# Patient Record
Sex: MALE | Race: WHITE | NOT HISPANIC OR LATINO | Employment: FULL TIME | URBAN - METROPOLITAN AREA
[De-identification: names, ages, dates, MRNs, and addresses within clinical notes are randomized per-mention and may not be internally consistent; named-entity substitution may affect disease eponyms.]

---

## 2023-03-04 PROBLEM — F41.9 ANXIETY: Status: ACTIVE | Noted: 2023-03-04

## 2023-03-04 PROBLEM — G47.00 INSOMNIA: Status: ACTIVE | Noted: 2023-03-04

## 2023-03-04 PROBLEM — K21.9 GERD (GASTROESOPHAGEAL REFLUX DISEASE): Status: ACTIVE | Noted: 2023-03-04

## 2023-03-04 PROBLEM — R74.8 ELEVATED LIVER ENZYMES: Status: ACTIVE | Noted: 2023-03-04

## 2023-03-04 PROBLEM — M79.673 FOOT PAIN: Status: ACTIVE | Noted: 2023-03-04

## 2023-03-04 PROBLEM — T63.441A STING, BEE: Status: ACTIVE | Noted: 2023-03-04

## 2023-03-04 PROBLEM — R79.89 LOW VITAMIN D LEVEL: Status: ACTIVE | Noted: 2023-03-04

## 2023-03-04 PROBLEM — R19.7 DIARRHEA: Status: ACTIVE | Noted: 2023-03-04

## 2023-03-04 PROBLEM — R60.9 EDEMA: Status: ACTIVE | Noted: 2023-03-04

## 2023-03-04 PROBLEM — K58.9 IRRITABLE BOWEL SYNDROME: Status: ACTIVE | Noted: 2023-03-04

## 2023-03-04 PROBLEM — R03.0 ELEVATED BLOOD PRESSURE READING: Status: ACTIVE | Noted: 2023-03-04

## 2023-03-04 PROBLEM — R53.83 FATIGUE: Status: ACTIVE | Noted: 2023-03-04

## 2023-03-04 RX ORDER — OMEPRAZOLE 40 MG/1
1 CAPSULE, DELAYED RELEASE ORAL DAILY
COMMUNITY
Start: 2019-07-16 | End: 2023-03-13

## 2023-03-04 RX ORDER — NORTRIPTYLINE HYDROCHLORIDE 25 MG/1
1 CAPSULE ORAL NIGHTLY
COMMUNITY
Start: 2019-07-16 | End: 2023-03-13

## 2023-03-04 RX ORDER — ASPIRIN 325 MG
TABLET, DELAYED RELEASE (ENTERIC COATED) ORAL
COMMUNITY
Start: 2019-07-19

## 2023-03-06 ENCOUNTER — APPOINTMENT (OUTPATIENT)
Dept: PRIMARY CARE | Facility: CLINIC | Age: 33
End: 2023-03-06
Payer: COMMERCIAL

## 2023-03-13 ENCOUNTER — OFFICE VISIT (OUTPATIENT)
Dept: PRIMARY CARE | Facility: CLINIC | Age: 33
End: 2023-03-13
Payer: COMMERCIAL

## 2023-03-13 VITALS — DIASTOLIC BLOOD PRESSURE: 85 MMHG | SYSTOLIC BLOOD PRESSURE: 145 MMHG | OXYGEN SATURATION: 99 % | HEART RATE: 103 BPM

## 2023-03-13 DIAGNOSIS — B37.0 THRUSH, ORAL: Primary | ICD-10-CM

## 2023-03-13 PROBLEM — L40.9 PSORIASIS: Status: ACTIVE | Noted: 2023-03-13

## 2023-03-13 LAB — POC HEMOGLOBIN A1C: 4.7 % (ref 4.2–6.5)

## 2023-03-13 PROCEDURE — 1036F TOBACCO NON-USER: CPT | Performed by: FAMILY MEDICINE

## 2023-03-13 PROCEDURE — 83036 HEMOGLOBIN GLYCOSYLATED A1C: CPT | Performed by: FAMILY MEDICINE

## 2023-03-13 PROCEDURE — 99203 OFFICE O/P NEW LOW 30 MIN: CPT | Performed by: FAMILY MEDICINE

## 2023-03-13 RX ORDER — NYSTATIN 100000 [USP'U]/ML
500000 SUSPENSION ORAL 4 TIMES DAILY
Qty: 280 ML | Refills: 0 | Status: SHIPPED | OUTPATIENT
Start: 2023-03-13 | End: 2023-03-27

## 2023-03-13 ASSESSMENT — ENCOUNTER SYMPTOMS
SHORTNESS OF BREATH: 0
COUGH: 0
NAUSEA: 1
SORE THROAT: 0
ABDOMINAL PAIN: 1
FEVER: 0
POLYPHAGIA: 0
VOMITING: 0
ADENOPATHY: 0
DIARRHEA: 1
BLOOD IN STOOL: 0
CHILLS: 0
CONFUSION: 0
POLYDIPSIA: 0
TROUBLE SWALLOWING: 0
UNEXPECTED WEIGHT CHANGE: 0
VOICE CHANGE: 0
WHEEZING: 0
RHINORRHEA: 0

## 2023-03-13 ASSESSMENT — PATIENT HEALTH QUESTIONNAIRE - PHQ9
SUM OF ALL RESPONSES TO PHQ9 QUESTIONS 1 & 2: 0
2. FEELING DOWN, DEPRESSED OR HOPELESS: NOT AT ALL
1. LITTLE INTEREST OR PLEASURE IN DOING THINGS: NOT AT ALL

## 2023-03-13 NOTE — PATIENT INSTRUCTIONS
Please return for a wellness visit after labs.    Please return or seek medical attention earlier if symptoms persist, change, worsen, or return. For emergencies, call [phone] or go to the nearest Emergency Room.

## 2023-03-13 NOTE — PROGRESS NOTES
Subjective   Patient ID: Armando Olivera is a 32 y.o. male who presents for Thrush (Pt presents complaining of a white coating on tongue and mouth, was given methylprednisolone in Dec thinks it could be from that).  C/o white film in mouth. Started in December after a course of Medrol (for psoriasis). Denies any other symptoms; denies burning, tingling, change in taste, fever, etc. Otherwise feeling well.     Saw dermatologist once in Dec 2022.        Review of Systems   Constitutional:  Negative for chills, fever and unexpected weight change.   HENT:  Positive for mouth sores. Negative for congestion, postnasal drip, rhinorrhea, sore throat, trouble swallowing and voice change.    Respiratory:  Negative for cough, shortness of breath and wheezing.    Gastrointestinal:  Positive for abdominal pain (stable with IBS), diarrhea (stable with IBS) and nausea (stable with IBS). Negative for blood in stool and vomiting.   Endocrine: Negative for cold intolerance, heat intolerance, polydipsia, polyphagia and polyuria.   Genitourinary: Negative.    Skin:  Negative for rash.   Hematological:  Negative for adenopathy.   Psychiatric/Behavioral:  Negative for behavioral problems and confusion.        Objective   Physical Exam  Constitutional:       Appearance: Normal appearance.   HENT:      Mouth/Throat:      Mouth: Mucous membranes are moist.      Comments: White plaques on the tongue and buccal mucosa.  Eyes:      General: No scleral icterus.     Extraocular Movements: Extraocular movements intact.      Conjunctiva/sclera: Conjunctivae normal.   Cardiovascular:      Rate and Rhythm: Normal rate and regular rhythm.      Heart sounds: Normal heart sounds.   Pulmonary:      Effort: Pulmonary effort is normal.      Breath sounds: Normal breath sounds.   Abdominal:      General: Abdomen is flat. There is no distension.      Palpations: Abdomen is soft. There is no mass.      Tenderness: There is no abdominal tenderness. There is  no guarding or rebound.   Musculoskeletal:      Cervical back: Normal range of motion and neck supple. No tenderness.   Lymphadenopathy:      Cervical: No cervical adenopathy.   Skin:     General: Skin is warm and dry.   Neurological:      Mental Status: He is alert and oriented to person, place, and time. Mental status is at baseline.   Psychiatric:         Mood and Affect: Mood normal.         Behavior: Behavior normal.         Thought Content: Thought content normal.         Judgment: Judgment normal.         Assessment/Plan   Diagnoses and all orders for this visit:  Thrush, oral  Comments:  Nystatin. Check basic/wellness labs.

## 2024-05-13 ENCOUNTER — OFFICE VISIT (OUTPATIENT)
Dept: PRIMARY CARE | Facility: CLINIC | Age: 34
End: 2024-05-13
Payer: COMMERCIAL

## 2024-05-13 VITALS
HEART RATE: 104 BPM | DIASTOLIC BLOOD PRESSURE: 76 MMHG | WEIGHT: 183.8 LBS | BODY MASS INDEX: 27.22 KG/M2 | SYSTOLIC BLOOD PRESSURE: 130 MMHG | OXYGEN SATURATION: 98 % | HEIGHT: 69 IN

## 2024-05-13 DIAGNOSIS — Z76.89 ESTABLISHING CARE WITH NEW DOCTOR, ENCOUNTER FOR: ICD-10-CM

## 2024-05-13 DIAGNOSIS — B37.0 THRUSH: ICD-10-CM

## 2024-05-13 DIAGNOSIS — K58.0 IRRITABLE BOWEL SYNDROME WITH DIARRHEA: ICD-10-CM

## 2024-05-13 DIAGNOSIS — Z00.00 HEALTHCARE MAINTENANCE: ICD-10-CM

## 2024-05-13 DIAGNOSIS — Z23 NEED FOR TDAP VACCINATION: Primary | ICD-10-CM

## 2024-05-13 DIAGNOSIS — F41.9 ANXIETY: ICD-10-CM

## 2024-05-13 PROCEDURE — 99204 OFFICE O/P NEW MOD 45 MIN: CPT | Performed by: STUDENT IN AN ORGANIZED HEALTH CARE EDUCATION/TRAINING PROGRAM

## 2024-05-13 PROCEDURE — 90715 TDAP VACCINE 7 YRS/> IM: CPT | Performed by: STUDENT IN AN ORGANIZED HEALTH CARE EDUCATION/TRAINING PROGRAM

## 2024-05-13 PROCEDURE — 1036F TOBACCO NON-USER: CPT | Performed by: STUDENT IN AN ORGANIZED HEALTH CARE EDUCATION/TRAINING PROGRAM

## 2024-05-13 PROCEDURE — 90471 IMMUNIZATION ADMIN: CPT | Performed by: STUDENT IN AN ORGANIZED HEALTH CARE EDUCATION/TRAINING PROGRAM

## 2024-05-13 RX ORDER — ESCITALOPRAM OXALATE 10 MG/1
10 TABLET ORAL DAILY
Qty: 30 TABLET | Refills: 1 | Status: SHIPPED | OUTPATIENT
Start: 2024-05-13 | End: 2024-11-09

## 2024-05-13 RX ORDER — NYSTATIN 100000 [USP'U]/ML
500000 SUSPENSION ORAL 4 TIMES DAILY
Qty: 280 ML | Refills: 0 | Status: SHIPPED | OUTPATIENT
Start: 2024-05-13 | End: 2024-05-27

## 2024-05-13 ASSESSMENT — PATIENT HEALTH QUESTIONNAIRE - PHQ9
2. FEELING DOWN, DEPRESSED OR HOPELESS: NOT AT ALL
1. LITTLE INTEREST OR PLEASURE IN DOING THINGS: NOT AT ALL
SUM OF ALL RESPONSES TO PHQ9 QUESTIONS 1 AND 2: 0

## 2024-05-13 ASSESSMENT — PAIN SCALES - GENERAL: PAINLEVEL: 0-NO PAIN

## 2024-05-13 NOTE — PROGRESS NOTES
"Subjective   Patient ID: Armando Olivera is a 33 y.o. male who presents for Establish Care (Anxiety, thrush).    HPI  34 yo here to establish care  Est care  Anxiety  Started getting worse during covid  Would like to start medication  Has done therapy in past  3. Thrush  Has had thrush in past  Has been back for 6 months  4. IBS  Takes CBD  Dietary modifications  5. HM  Would like tdap  Has improved diet after realizing that his cholesterol was elevated    Review of Systems  All pertinent positive symptoms are included in the history of present illness.    All other systems have been reviewed and are negative and noncontributory to this patient's current ailments.     Allergies   Allergen Reactions    Augmentin [Amoxicillin-Pot Clavulanate] Other        Immunization History   Administered Date(s) Administered    DT (pediatric) 09/11/2007    DTP 1989, 05/13/1991, 09/01/1994, 01/02/1995, 09/27/1998    Hepatitis B vaccine, adult (RECOMBIVAX, ENGERIX) 08/12/1998, 09/16/1998, 03/17/1999    Hib (HbOC) 09/01/1997    MMR vaccine, subcutaneous (MMR II) 01/26/1990, 08/02/2001    PPD Test 08/21/2003    Poliovirus vaccine, subcutaneous (IPOL) 1989, 1989, 05/13/1991, 01/02/1995    Varicella vaccine, subcutaneous (VARIVAX) 08/19/1997, 08/02/2011       Objective   Vitals:    05/13/24 1406   BP: 130/76   Pulse: 104   SpO2: 98%   Weight: 83.4 kg (183 lb 12.8 oz)   Height: 1.753 m (5' 9\")       Physical Exam  CONSTITUTIONAL - well nourished, well developed, looks like stated age, in no acute distress  SKIN - normal skin color and pigmentation. No rash, lesions, or nodules visualized  HEAD - no trauma, normocephalic  EYES - extraocular muscles are intact  ENT - atraumatic  NECK - no neck mass was observed  LUNGS - CTA B/L  CARDIAC - regular rate and regular rhythm  ABDOMEN - no organomegaly, soft, nontender, nondistended  EXTREMITIES - no edema, no deformities  MSK - moves limbs equally  NEUROLOGICAL - alert, " oriented and no focal signs  PSYCHIATRIC - alert, pleasant and cordial, age-appropriate  IMMUNOLOGIC - no cervical lymphadenopathy     Assessment/Plan   32 yo here to establish care  1. Est care  PMHx reviewed  2. Anxiety  Start lexapro  3. Thrush  Start nystatin swish and swallow  4. IBS  Continue CBD, Dietary modifications  5. HM  Tdap given  Blood work ordered    RTC in 6 weeks for anxiety follow up

## 2024-05-21 ENCOUNTER — LAB (OUTPATIENT)
Dept: LAB | Facility: LAB | Age: 34
End: 2024-05-21
Payer: COMMERCIAL

## 2024-05-21 DIAGNOSIS — Z00.00 HEALTHCARE MAINTENANCE: ICD-10-CM

## 2024-05-21 LAB
BASOPHILS # BLD AUTO: 0.04 X10*3/UL (ref 0–0.1)
BASOPHILS NFR BLD AUTO: 0.7 %
EOSINOPHIL # BLD AUTO: 0.06 X10*3/UL (ref 0–0.7)
EOSINOPHIL NFR BLD AUTO: 1.1 %
ERYTHROCYTE [DISTWIDTH] IN BLOOD BY AUTOMATED COUNT: 11.9 % (ref 11.5–14.5)
HCT VFR BLD AUTO: 45 % (ref 41–52)
HGB BLD-MCNC: 15.9 G/DL (ref 13.5–17.5)
IMM GRANULOCYTES # BLD AUTO: 0.01 X10*3/UL (ref 0–0.7)
IMM GRANULOCYTES NFR BLD AUTO: 0.2 % (ref 0–0.9)
LYMPHOCYTES # BLD AUTO: 0.98 X10*3/UL (ref 1.2–4.8)
LYMPHOCYTES NFR BLD AUTO: 17.8 %
MCH RBC QN AUTO: 30.2 PG (ref 26–34)
MCHC RBC AUTO-ENTMCNC: 35.3 G/DL (ref 32–36)
MCV RBC AUTO: 86 FL (ref 80–100)
MONOCYTES # BLD AUTO: 0.28 X10*3/UL (ref 0.1–1)
MONOCYTES NFR BLD AUTO: 5.1 %
NEUTROPHILS # BLD AUTO: 4.13 X10*3/UL (ref 1.2–7.7)
NEUTROPHILS NFR BLD AUTO: 75.1 %
NRBC BLD-RTO: 0 /100 WBCS (ref 0–0)
PLATELET # BLD AUTO: 279 X10*3/UL (ref 150–450)
RBC # BLD AUTO: 5.26 X10*6/UL (ref 4.5–5.9)
TSH SERPL-ACNC: 1.04 MIU/L (ref 0.44–3.98)
WBC # BLD AUTO: 5.5 X10*3/UL (ref 4.4–11.3)

## 2024-05-21 PROCEDURE — 36415 COLL VENOUS BLD VENIPUNCTURE: CPT

## 2024-05-21 PROCEDURE — 85025 COMPLETE CBC W/AUTO DIFF WBC: CPT

## 2024-05-21 PROCEDURE — 80053 COMPREHEN METABOLIC PANEL: CPT

## 2024-05-21 PROCEDURE — 80061 LIPID PANEL: CPT

## 2024-05-21 PROCEDURE — 84443 ASSAY THYROID STIM HORMONE: CPT

## 2024-05-22 ENCOUNTER — E-VISIT (OUTPATIENT)
Dept: PRIMARY CARE | Facility: CLINIC | Age: 34
End: 2024-05-22
Payer: COMMERCIAL

## 2024-05-22 LAB
ALBUMIN SERPL BCP-MCNC: 4.9 G/DL (ref 3.4–5)
ALP SERPL-CCNC: 55 U/L (ref 33–120)
ALT SERPL W P-5'-P-CCNC: 61 U/L (ref 10–52)
ANION GAP SERPL CALC-SCNC: 14 MMOL/L (ref 10–20)
AST SERPL W P-5'-P-CCNC: 27 U/L (ref 9–39)
BILIRUB SERPL-MCNC: 0.6 MG/DL (ref 0–1.2)
BUN SERPL-MCNC: 12 MG/DL (ref 6–23)
CALCIUM SERPL-MCNC: 9.6 MG/DL (ref 8.6–10.6)
CHLORIDE SERPL-SCNC: 101 MMOL/L (ref 98–107)
CHOLEST SERPL-MCNC: 203 MG/DL (ref 0–199)
CHOLESTEROL/HDL RATIO: 2.9
CO2 SERPL-SCNC: 29 MMOL/L (ref 21–32)
CREAT SERPL-MCNC: 0.88 MG/DL (ref 0.5–1.3)
EGFRCR SERPLBLD CKD-EPI 2021: >90 ML/MIN/1.73M*2
GLUCOSE SERPL-MCNC: 94 MG/DL (ref 74–99)
HDLC SERPL-MCNC: 70.2 MG/DL
LDLC SERPL CALC-MCNC: 111 MG/DL
NON HDL CHOLESTEROL: 133 MG/DL (ref 0–149)
POTASSIUM SERPL-SCNC: 4.2 MMOL/L (ref 3.5–5.3)
PROT SERPL-MCNC: 7.5 G/DL (ref 6.4–8.2)
SODIUM SERPL-SCNC: 140 MMOL/L (ref 136–145)
TRIGL SERPL-MCNC: 107 MG/DL (ref 0–149)
VLDL: 21 MG/DL (ref 0–40)

## 2024-05-24 ENCOUNTER — TELEPHONE (OUTPATIENT)
Dept: PRIMARY CARE | Facility: CLINIC | Age: 34
End: 2024-05-24
Payer: COMMERCIAL

## 2024-05-29 ENCOUNTER — OFFICE VISIT (OUTPATIENT)
Dept: PRIMARY CARE | Facility: CLINIC | Age: 34
End: 2024-05-29
Payer: COMMERCIAL

## 2024-05-29 VITALS
HEART RATE: 100 BPM | BODY MASS INDEX: 26.96 KG/M2 | DIASTOLIC BLOOD PRESSURE: 85 MMHG | WEIGHT: 182 LBS | HEIGHT: 69 IN | SYSTOLIC BLOOD PRESSURE: 130 MMHG | OXYGEN SATURATION: 97 %

## 2024-05-29 DIAGNOSIS — R03.0 ELEVATED BP WITHOUT DIAGNOSIS OF HYPERTENSION: ICD-10-CM

## 2024-05-29 DIAGNOSIS — G47.30 SLEEP APNEA, UNSPECIFIED TYPE: Primary | ICD-10-CM

## 2024-05-29 PROCEDURE — 99213 OFFICE O/P EST LOW 20 MIN: CPT | Performed by: STUDENT IN AN ORGANIZED HEALTH CARE EDUCATION/TRAINING PROGRAM

## 2024-05-29 PROCEDURE — 1036F TOBACCO NON-USER: CPT | Performed by: STUDENT IN AN ORGANIZED HEALTH CARE EDUCATION/TRAINING PROGRAM

## 2024-05-29 RX ORDER — DEXAMETHASONE 0.75 MG/1
TABLET ORAL
COMMUNITY
Start: 2024-05-28

## 2024-05-29 RX ORDER — IBUPROFEN 800 MG/1
TABLET ORAL
COMMUNITY
Start: 2024-05-28

## 2024-05-29 ASSESSMENT — PATIENT HEALTH QUESTIONNAIRE - PHQ9
2. FEELING DOWN, DEPRESSED OR HOPELESS: NOT AT ALL
SUM OF ALL RESPONSES TO PHQ9 QUESTIONS 1 AND 2: 0
1. LITTLE INTEREST OR PLEASURE IN DOING THINGS: NOT AT ALL

## 2024-05-29 ASSESSMENT — PAIN SCALES - GENERAL: PAINLEVEL: 0-NO PAIN

## 2024-05-29 NOTE — PROGRESS NOTES
"Subjective   Patient ID: Armando Olivera is a 33 y.o. male who presents for Follow-up (BP follow up).    HPI  32 yo male here for BP follow up  Concern for sleep apnea  Has woken up gasping for air  Snores occasionally  Cousin has sleep apnea  Doesn't feel well rested in the morning  Wakes up with headaches half the time  Sleeps with mouth open  Can take him hours to fall asleep  2. Elevated BP at dentist  Has been checking Bps at home  Bps here WNL    Review of Systems  All pertinent positive symptoms are included in the history of present illness.    All other systems have been reviewed and are negative and noncontributory to this patient's current ailments.     Allergies   Allergen Reactions    Augmentin [Amoxicillin-Pot Clavulanate] Other        Immunization History   Administered Date(s) Administered    DT (pediatric) 09/11/2007    DTP 1989, 05/13/1991, 09/01/1994, 01/02/1995, 09/27/1998    Hepatitis B vaccine, adult (RECOMBIVAX, ENGERIX) 08/12/1998, 09/16/1998, 03/17/1999    Hib (HbOC) 09/01/1997    MMR vaccine, subcutaneous (MMR II) 01/26/1990, 08/02/2001    PPD Test 08/21/2003    Poliovirus vaccine, subcutaneous (IPOL) 1989, 1989, 05/13/1991, 01/02/1995    Tdap vaccine, age 7 year and older (BOOSTRIX, ADACEL) 05/13/2024    Varicella vaccine, subcutaneous (VARIVAX) 08/19/1997, 08/02/2011       Objective   Vitals:    05/29/24 1034   BP: 126/82   Pulse: 100   SpO2: 97%   Weight: 82.6 kg (182 lb)   Height: 1.753 m (5' 9\")       Physical Exam  CONSTITUTIONAL - well nourished, well developed, looks like stated age, in no acute distress  SKIN - normal skin color and pigmentation. No rash, lesions, or nodules visualized  HEAD - no trauma, normocephalic  EYES - extraocular muscles are intact  ENT - atraumatic  NECK - no neck mass was observed  LUNGS - CTA B/L  CARDIAC - regular rate and regular rhythm  ABDOMEN - no organomegaly, soft, nontender, nondistended  EXTREMITIES - no edema, no " deformities  MSK - moves limbs equally  NEUROLOGICAL - alert, oriented and no focal signs  PSYCHIATRIC - alert, pleasant and cordial, age-appropriate  IMMUNOLOGIC - no cervical lymphadenopathy     Assessment/Plan   32 yo male here for BP follow up  Concern for sleep apnea  Sleep study ordered  2. Elevated BP at dentist  Continue anxiety medication, will continue to monitor Bps    RTC as needed

## 2024-06-25 ENCOUNTER — OFFICE VISIT (OUTPATIENT)
Dept: PRIMARY CARE | Facility: CLINIC | Age: 34
End: 2024-06-25
Payer: COMMERCIAL

## 2024-06-25 VITALS
OXYGEN SATURATION: 99 % | HEART RATE: 85 BPM | DIASTOLIC BLOOD PRESSURE: 82 MMHG | BODY MASS INDEX: 27.22 KG/M2 | WEIGHT: 183.8 LBS | SYSTOLIC BLOOD PRESSURE: 130 MMHG | HEIGHT: 69 IN

## 2024-06-25 DIAGNOSIS — F41.9 ANXIETY: ICD-10-CM

## 2024-06-25 PROCEDURE — 99213 OFFICE O/P EST LOW 20 MIN: CPT | Performed by: STUDENT IN AN ORGANIZED HEALTH CARE EDUCATION/TRAINING PROGRAM

## 2024-06-25 PROCEDURE — 1036F TOBACCO NON-USER: CPT | Performed by: STUDENT IN AN ORGANIZED HEALTH CARE EDUCATION/TRAINING PROGRAM

## 2024-06-25 RX ORDER — ESCITALOPRAM OXALATE 10 MG/1
10 TABLET ORAL DAILY
Qty: 90 TABLET | Refills: 1 | Status: SHIPPED | OUTPATIENT
Start: 2024-06-25 | End: 2024-12-22

## 2024-06-25 ASSESSMENT — PAIN SCALES - GENERAL: PAINLEVEL: 0-NO PAIN

## 2024-06-25 ASSESSMENT — PATIENT HEALTH QUESTIONNAIRE - PHQ9
1. LITTLE INTEREST OR PLEASURE IN DOING THINGS: NOT AT ALL
SUM OF ALL RESPONSES TO PHQ9 QUESTIONS 1 AND 2: 0
2. FEELING DOWN, DEPRESSED OR HOPELESS: NOT AT ALL

## 2024-06-25 NOTE — PROGRESS NOTES
"Subjective   Patient ID: Armando Olivera is a 33 y.o. male who presents for Follow-up (anxiety).    HPI  34 yo male here for anxiety follow up  Anxiety  Feels like he can think more clearly  Denies side effects currently  Feels good on current dose  Did forget dose one day and felt a difference    Review of Systems  All pertinent positive symptoms are included in the history of present illness.    All other systems have been reviewed and are negative and noncontributory to this patient's current ailments.     Allergies   Allergen Reactions    Augmentin [Amoxicillin-Pot Clavulanate] Other        Immunization History   Administered Date(s) Administered    DT (pediatric) 09/11/2007    DTP 1989, 05/13/1991, 09/01/1994, 01/02/1995, 09/27/1998    Hepatitis B vaccine, adult *Check Product/Dose* 08/12/1998, 09/16/1998, 03/17/1999    Hib (HbOC) 09/01/1997    MMR vaccine, subcutaneous (MMR II) 01/26/1990, 08/02/2001    PPD Test 08/21/2003    Poliovirus vaccine, subcutaneous (IPOL) 1989, 1989, 05/13/1991, 01/02/1995    Tdap vaccine, age 7 year and older (BOOSTRIX, ADACEL) 05/13/2024    Varicella vaccine, subcutaneous (VARIVAX) 08/19/1997, 08/02/2011       Objective   Vitals:    06/25/24 1321   BP: 130/82   Pulse: 85   SpO2: 99%   Weight: 83.4 kg (183 lb 12.8 oz)   Height: 1.753 m (5' 9\")       Physical Exam  CONSTITUTIONAL - well nourished, well developed, looks like stated age, in no acute distress  SKIN - normal skin color and pigmentation. No rash, lesions, or nodules visualized  HEAD - no trauma, normocephalic  EYES - extraocular muscles are intact  ENT - atraumatic  NECK - no neck mass was observed  LUNGS - unlabored breathing  ABDOMEN - nondistended  EXTREMITIES - no edema, no deformities  MSK - moves limbs equally  NEUROLOGICAL - alert, oriented and no focal signs  PSYCHIATRIC - alert, pleasant and cordial, age-appropriate  IMMUNOLOGIC - no cervical lymphadenopathy     Assessment/Plan   34 yo male here " for anxiety follow up  Anxiety  Continue lexapro 10 mg    RTC in 6 months

## 2024-12-30 ENCOUNTER — OFFICE VISIT (OUTPATIENT)
Dept: PRIMARY CARE | Facility: CLINIC | Age: 34
End: 2024-12-30
Payer: COMMERCIAL

## 2024-12-30 VITALS
OXYGEN SATURATION: 99 % | HEIGHT: 69 IN | DIASTOLIC BLOOD PRESSURE: 80 MMHG | HEART RATE: 90 BPM | SYSTOLIC BLOOD PRESSURE: 128 MMHG | BODY MASS INDEX: 27.25 KG/M2 | WEIGHT: 184 LBS

## 2024-12-30 DIAGNOSIS — F41.9 ANXIETY: Primary | ICD-10-CM

## 2024-12-30 PROCEDURE — 1036F TOBACCO NON-USER: CPT | Performed by: STUDENT IN AN ORGANIZED HEALTH CARE EDUCATION/TRAINING PROGRAM

## 2024-12-30 PROCEDURE — 99213 OFFICE O/P EST LOW 20 MIN: CPT | Performed by: STUDENT IN AN ORGANIZED HEALTH CARE EDUCATION/TRAINING PROGRAM

## 2024-12-30 PROCEDURE — 3008F BODY MASS INDEX DOCD: CPT | Performed by: STUDENT IN AN ORGANIZED HEALTH CARE EDUCATION/TRAINING PROGRAM

## 2024-12-30 RX ORDER — ESCITALOPRAM OXALATE 20 MG/1
20 TABLET ORAL DAILY
Qty: 30 TABLET | Refills: 1 | Status: SHIPPED | OUTPATIENT
Start: 2024-12-30 | End: 2025-06-28

## 2024-12-30 ASSESSMENT — PAIN SCALES - GENERAL: PAINLEVEL_OUTOF10: 0-NO PAIN

## 2024-12-30 ASSESSMENT — PATIENT HEALTH QUESTIONNAIRE - PHQ9
SUM OF ALL RESPONSES TO PHQ9 QUESTIONS 1 AND 2: 0
1. LITTLE INTEREST OR PLEASURE IN DOING THINGS: NOT AT ALL
2. FEELING DOWN, DEPRESSED OR HOPELESS: NOT AT ALL

## 2024-12-30 NOTE — PROGRESS NOTES
"Subjective   Patient ID: Armando Olivera is a 34 y.o. male who presents for Follow-up (6 month follow up, depression follow up).    HPI  35 yo male here for 6 month follow up and anxiety follow up  Anxiety  Medication has helped with social anxiety  Still having obsessive thoughts  People have noticed improvement in his work  Would like to try therapy    Review of Systems  All pertinent positive symptoms are included in the history of present illness.    All other systems have been reviewed and are negative and noncontributory to this patient's current ailments.     Allergies   Allergen Reactions    Augmentin [Amoxicillin-Pot Clavulanate] Other        Immunization History   Administered Date(s) Administered    DT (pediatric) 09/11/2007    DTP 1989, 05/13/1991, 09/01/1994, 01/02/1995, 09/27/1998    Hepatitis B vaccine, adult *Check Product/Dose* 08/12/1998, 09/16/1998, 03/17/1999    Hib (HbOC) 09/01/1997    MMR vaccine, subcutaneous (MMR II) 01/26/1990, 08/02/2001    PPD Test 08/21/2003    Poliovirus vaccine, subcutaneous (IPOL) 1989, 1989, 05/13/1991, 01/02/1995    Tdap vaccine, age 7 year and older (BOOSTRIX, ADACEL) 05/13/2024    Varicella vaccine, subcutaneous (VARIVAX) 08/19/1997, 08/02/2011       Objective   Vitals:    12/30/24 1540   BP: 128/80   Pulse: 90   SpO2: 99%   Weight: 83.5 kg (184 lb)   Height: 1.753 m (5' 9\")       Physical Exam  CONSTITUTIONAL - well nourished, well developed, looks like stated age, in no acute distress  SKIN - normal skin color and pigmentation. No rash, lesions, or nodules visualized  HEAD - no trauma, normocephalic  EYES - extraocular muscles are intact  ENT - atraumatic  NECK - no neck mass was observed  LUNGS - CTA B/L  CARDIAC - regular rate and regular rhythm  ABDOMEN - no organomegaly, soft, nontender, nondistended  EXTREMITIES - no edema, no deformities  MSK - moves limbs equally  NEUROLOGICAL - alert, oriented and no focal signs  PSYCHIATRIC - alert, " pleasant and cordial, age-appropriate  IMMUNOLOGIC - no cervical lymphadenopathy     Assessment/Plan   35 yo male here for 6 month follow up and anxiety follow up  Anxiety  Increase lexapro to 20 mg  Therapy referral    RTC in 6 months for HM

## 2025-03-11 ENCOUNTER — TELEPHONE (OUTPATIENT)
Dept: PRIMARY CARE | Facility: CLINIC | Age: 35
End: 2025-03-11
Payer: COMMERCIAL

## 2025-03-11 DIAGNOSIS — F41.9 ANXIETY: ICD-10-CM

## 2025-03-11 RX ORDER — ESCITALOPRAM OXALATE 20 MG/1
20 TABLET ORAL DAILY
Qty: 90 TABLET | Refills: 3 | Status: SHIPPED | OUTPATIENT
Start: 2025-03-11 | End: 2025-03-11 | Stop reason: SDUPTHER

## 2025-03-11 RX ORDER — ESCITALOPRAM OXALATE 20 MG/1
20 TABLET ORAL DAILY
Qty: 90 TABLET | Refills: 3 | Status: SHIPPED | OUTPATIENT
Start: 2025-03-11 | End: 2026-03-06

## 2025-03-11 NOTE — TELEPHONE ENCOUNTER
Pt called requesting to resend medication to [facility] in [place] instead - updated in chart